# Patient Record
Sex: FEMALE | Race: WHITE | NOT HISPANIC OR LATINO | URBAN - METROPOLITAN AREA
[De-identification: names, ages, dates, MRNs, and addresses within clinical notes are randomized per-mention and may not be internally consistent; named-entity substitution may affect disease eponyms.]

---

## 2019-04-16 ENCOUNTER — EMERGENCY (EMERGENCY)
Facility: HOSPITAL | Age: 29
LOS: 1 days | Discharge: HOME | End: 2019-04-16
Admitting: EMERGENCY MEDICINE
Payer: SELF-PAY

## 2019-04-16 VITALS
HEART RATE: 68 BPM | SYSTOLIC BLOOD PRESSURE: 111 MMHG | DIASTOLIC BLOOD PRESSURE: 76 MMHG | TEMPERATURE: 98 F | OXYGEN SATURATION: 99 % | HEIGHT: 67 IN | RESPIRATION RATE: 16 BRPM | WEIGHT: 138.89 LBS

## 2019-04-16 VITALS
TEMPERATURE: 98 F | DIASTOLIC BLOOD PRESSURE: 80 MMHG | RESPIRATION RATE: 16 BRPM | SYSTOLIC BLOOD PRESSURE: 124 MMHG | HEART RATE: 70 BPM | OXYGEN SATURATION: 99 %

## 2019-04-16 DIAGNOSIS — L25.0 UNSPECIFIED CONTACT DERMATITIS DUE TO COSMETICS: ICD-10-CM

## 2019-04-16 DIAGNOSIS — L29.9 PRURITUS, UNSPECIFIED: ICD-10-CM

## 2019-04-16 PROCEDURE — 99283 EMERGENCY DEPT VISIT LOW MDM: CPT

## 2019-04-16 PROCEDURE — 99283 EMERGENCY DEPT VISIT LOW MDM: CPT | Mod: 25

## 2019-04-16 PROCEDURE — 96372 THER/PROPH/DIAG INJ SC/IM: CPT

## 2019-04-16 RX ORDER — DIPHENHYDRAMINE HCL 50 MG
1 CAPSULE ORAL
Qty: 12 | Refills: 0 | OUTPATIENT
Start: 2019-04-16 | End: 2019-04-19

## 2019-04-16 RX ORDER — DEXAMETHASONE 0.5 MG/5ML
5 ELIXIR ORAL ONCE
Qty: 0 | Refills: 0 | Status: COMPLETED | OUTPATIENT
Start: 2019-04-16 | End: 2019-04-16

## 2019-04-16 RX ORDER — DIPHENHYDRAMINE HCL 50 MG
50 CAPSULE ORAL ONCE
Qty: 0 | Refills: 0 | Status: COMPLETED | OUTPATIENT
Start: 2019-04-16 | End: 2019-04-16

## 2019-04-16 RX ADMIN — Medication 50 MILLIGRAM(S): at 11:14

## 2019-04-16 RX ADMIN — Medication 5 MILLIGRAM(S): at 11:14

## 2019-04-16 NOTE — ED PROVIDER NOTE - SKIN LOCATION #1
face, erythematous papular rash, with no vesicles , hives, nontender, no fever, URI symptoms,  or signs  of cellulitis.. Rash is isolated and appears to be  a  contact dermatitis ,  adverse reaction to new make up.

## 2019-04-16 NOTE — ED ADULT NURSE NOTE - CAS DISCH ACCOMP BY
[facility]  [address]  [place] [address]  Phone: [phone]    February 26, 2018        Henna Mario  [address] [facility] [address]          To whom it may concern:    RE: Henna Mario    Patient was seen and treated today at our clinic.  Henna has influenza and should not be around the general public for another 5-7 days.      Please contact me for questions or concerns.      Sincerely,        Patricia Calderon MD   Self

## 2019-04-16 NOTE — ED PROVIDER NOTE - OBJECTIVE STATEMENT
30 y/o f with no pmh present to ED c/o pruritic facial rash after using a foundation that was new make up. No similar episode in the past. New line of facial make up and facial care. Denies any breathing problems, swelling to lips or tongue. No other area  on the body with rash, no cold symptoms,  or  fever. Patient is from [place] traveling through [facility].

## 2019-04-16 NOTE — ED PROVIDER NOTE - NSFOLLOWUPINSTRUCTIONS_ED_ALL_ED_FT
Continue benadryl as necessary. Avoid any new make up, foundation or creams until completely resolved. Cleanse face normally and pat dry. Do not scratch or rub face. You received a IM injection of steroid that stays in in your system for a few days.

## 2019-04-16 NOTE — ED ADULT TRIAGE NOTE - CHIEF COMPLAINT QUOTE
pt c/o rash to the face that started yesterday. pt states " It started after getting off an airplane. I think it is because I changed my makeup." denies fever, pain, or itching.

## 2019-04-16 NOTE — ED PROVIDER NOTE - CHPI ED SYMPTOMS NEG
no pain/no petechia/no chills/no decreased eating/drinking/no bruising/no vomiting/no scaly patches on skin/no inflammation/no fever

## 2019-04-16 NOTE — ED PROVIDER NOTE - CLINICAL SUMMARY MEDICAL DECISION MAKING FREE TEXT BOX
Patient with contact dermatitis from new make up. Afebrile , well appearing, NAD with no ac signs of any contagious appearing rash. Recommend to stop all new make or skin care products. Cleanse skin with hypo allergic or sensitive skin products. Benadryl PRN and pt was given decadron.

## 2019-04-16 NOTE — ED ADULT NURSE NOTE - OBJECTIVE STATEMENT
Patient is a 30yo female, works as a , reporting facial rash 2 days ago r/t starting new makeup products for the first time in 4 years. Patient has maculopapular rash to face. No rash in any other areas. Denies pruritis, respiratory symptoms, nasal congestion, fevers.

## 2019-04-17 ENCOUNTER — EMERGENCY (EMERGENCY)
Facility: HOSPITAL | Age: 29
LOS: 1 days | Discharge: HOME | End: 2019-04-17
Admitting: EMERGENCY MEDICINE
Payer: COMMERCIAL

## 2019-04-17 VITALS
RESPIRATION RATE: 18 BRPM | OXYGEN SATURATION: 100 % | DIASTOLIC BLOOD PRESSURE: 63 MMHG | HEART RATE: 67 BPM | WEIGHT: 138.89 LBS | SYSTOLIC BLOOD PRESSURE: 100 MMHG | TEMPERATURE: 98 F | HEIGHT: 67 IN

## 2019-04-17 DIAGNOSIS — Z79.899 OTHER LONG TERM (CURRENT) DRUG THERAPY: ICD-10-CM

## 2019-04-17 DIAGNOSIS — L20.9 ATOPIC DERMATITIS, UNSPECIFIED: ICD-10-CM

## 2019-04-17 DIAGNOSIS — R21 RASH AND OTHER NONSPECIFIC SKIN ERUPTION: ICD-10-CM

## 2019-04-17 DIAGNOSIS — Z79.52 LONG TERM (CURRENT) USE OF SYSTEMIC STEROIDS: ICD-10-CM

## 2019-04-17 PROCEDURE — 99283 EMERGENCY DEPT VISIT LOW MDM: CPT

## 2019-04-17 RX ADMIN — Medication 60 MILLIGRAM(S): at 12:43

## 2019-04-17 NOTE — ED ADULT NURSE NOTE - CHPI ED NUR SYMPTOMS NEG
no vomiting/no decreased eating/drinking/no body aches/no fever/no pain/no inflammation/no confusion/no petechia/no itching/no scaly patches on skin/no chills

## 2019-04-17 NOTE — ED PROVIDER NOTE - SKIN, MLM
Skin normal color for race, warm, dry and intact.  facial erythema, macular rash, pruritic, no blisters, no sores, no scales. no tongue edema, no eyelilds edema,

## 2019-04-17 NOTE — ED ADULT TRIAGE NOTE - CHIEF COMPLAINT QUOTE
patient came for rashes on the face since yesterday , was seen in the ER yesterday diagnosed with atopic dermatitis .  Wants to have another shot of steroid today .

## 2019-04-17 NOTE — ED PROVIDER NOTE - OBJECTIVE STATEMENT
28 yo female in the ER c/o rash on her face. Pt mentioned it started yesterday after she applied a new make-up on her face. Pt denies any fever, chills, n/v, difficulty breathing, tongue edema. Pt was seen yesterday ion the Er and received a dose of IM Decadron , had improvement after and rash was fading. This morning pt noted that rash is back as well as redness. pt is flying back home tonight and would like to get more treatment before she fly home.

## 2019-04-17 NOTE — ED ADULT NURSE NOTE - OBJECTIVE STATEMENT
30 y/o F a&ox4 walked in from front triage [facility] rash[facility] forehead and L cheek x 2 days after starting new foundation.  Seen yesterday at [facility] was given IV steroids. reports no change since yesterday. denies fevers, chills, SOB.